# Patient Record
Sex: FEMALE | URBAN - METROPOLITAN AREA
[De-identification: names, ages, dates, MRNs, and addresses within clinical notes are randomized per-mention and may not be internally consistent; named-entity substitution may affect disease eponyms.]

---

## 2022-08-06 ENCOUNTER — APPOINTMENT (RX ONLY)
Dept: URBAN - METROPOLITAN AREA CLINIC 328 | Facility: CLINIC | Age: 42
Setting detail: DERMATOLOGY
End: 2022-08-06

## 2022-08-06 DIAGNOSIS — Z41.9 ENCOUNTER FOR PROCEDURE FOR PURPOSES OTHER THAN REMEDYING HEALTH STATE, UNSPECIFIED: ICD-10-CM

## 2022-08-06 PROCEDURE — ? COSMETIC CONSULTATION: FILLERS

## 2022-08-06 PROCEDURE — ? FILLERS

## 2022-08-06 PROCEDURE — ? BOTOX

## 2022-08-06 ASSESSMENT — LOCATION ZONE DERM: LOCATION ZONE: FACE

## 2022-08-06 ASSESSMENT — LOCATION SIMPLE DESCRIPTION DERM: LOCATION SIMPLE: LEFT CHEEK

## 2022-08-06 ASSESSMENT — LOCATION DETAILED DESCRIPTION DERM: LOCATION DETAILED: LEFT CENTRAL MALAR CHEEK

## 2022-08-06 NOTE — PROCEDURE: BOTOX
Post-Care Instructions: 1.  Do not lie down or bend forward for 4 hours.\\n\\n2. Work the treated areas by wrinkling the forehead or frowning for five minutes out of every hour for 4 hours.\\n\\n3.  Do not exercise (running, aerobics, weight lifting, etc...) for 24 hours.\\n\\n4.  No aspirin or ibuprofen for 24 hours.\\n\\n5.  Avoid wearing hats, head bands, or any tight head pieces for 24 hours.\\n\\n6.  It will take 2 weeks to get the full effect of Botox.\\n\\n7.  If bruising is a concern, Arnica is a supplement that minimizes bruising.  To prevent bruising, the supplement can be taken once daily for 1 week prior to the procedure.  This can also be taken starting on the same day as the procedure to minimize bruising and quicken the healing process if bruises do appear.  If starting on the same day as the procedure, please follow instructions on the package.  This supplement is available at local vitamin shops.

## 2022-08-18 ENCOUNTER — APPOINTMENT (RX ONLY)
Dept: URBAN - METROPOLITAN AREA CLINIC 331 | Facility: CLINIC | Age: 42
Setting detail: DERMATOLOGY
End: 2022-08-18

## 2022-08-18 DIAGNOSIS — Z41.9 ENCOUNTER FOR PROCEDURE FOR PURPOSES OTHER THAN REMEDYING HEALTH STATE, UNSPECIFIED: ICD-10-CM

## 2022-08-18 PROCEDURE — ? JUVEDERM ULTRA XC INJECTION

## 2022-09-09 ENCOUNTER — APPOINTMENT (RX ONLY)
Dept: URBAN - METROPOLITAN AREA CLINIC 331 | Facility: CLINIC | Age: 42
Setting detail: DERMATOLOGY
End: 2022-09-09

## 2022-09-09 DIAGNOSIS — Z41.9 ENCOUNTER FOR PROCEDURE FOR PURPOSES OTHER THAN REMEDYING HEALTH STATE, UNSPECIFIED: ICD-10-CM

## 2022-09-09 PROCEDURE — ? JUVEDERM ULTRA PLUS XC INJECTION

## 2022-09-09 PROCEDURE — ? ADDITIONAL NOTES

## 2022-09-09 ASSESSMENT — LOCATION SIMPLE DESCRIPTION DERM: LOCATION SIMPLE: LEFT LIP

## 2022-09-09 ASSESSMENT — LOCATION DETAILED DESCRIPTION DERM: LOCATION DETAILED: LEFT SUPERIOR VERMILION LIP

## 2022-09-09 ASSESSMENT — LOCATION ZONE DERM: LOCATION ZONE: LIP

## 2022-09-09 NOTE — PROCEDURE: JUVEDERM ULTRA PLUS XC INJECTION
Mid Face Filler  Volume In Cc: 0
Map Statment: See Attach Map for Complete Details
Detail Level: Detailed
Anesthesia Volume In Cc: 0.5
Include Cannula Information In Note?: No
Post-Care Instructions: Patient instructed to apply ice to reduce swelling.
Number Of Syringes (Required For Inventory): 1
Procedural Text: The filler was administered to the treatment areas noted above.
Consent: Written consent obtained. Risks include but not limited to bruising, beading, irregular texture, ulceration, infection, allergic reaction, scar formation, incomplete augmentation, temporary nature, procedural pain.
Additional Anesthesia Volume In Cc: 6
Anesthesia Type: 1% lidocaine with epinephrine
Filler: Juvederm Ultra Plus XC
(0) independent

## 2022-09-09 NOTE — PROCEDURE: ADDITIONAL NOTES
Render Risk Assessment In Note?: no
Additional Notes: During her previous visit patient had a vasovagal episode and we were not able to do her lip filler. It was completed today with no complications. NO charge for today as she paid during her prior visit.
Detail Level: Simple

## 2022-09-22 ENCOUNTER — APPOINTMENT (RX ONLY)
Dept: URBAN - METROPOLITAN AREA CLINIC 331 | Facility: CLINIC | Age: 42
Setting detail: DERMATOLOGY
End: 2022-09-22

## 2022-09-22 DIAGNOSIS — Z41.9 ENCOUNTER FOR PROCEDURE FOR PURPOSES OTHER THAN REMEDYING HEALTH STATE, UNSPECIFIED: ICD-10-CM

## 2022-09-22 PROCEDURE — ? ADDITIONAL NOTES

## 2022-09-22 PROCEDURE — ? BOTOX

## 2022-09-22 ASSESSMENT — LOCATION ZONE DERM: LOCATION ZONE: LIP

## 2022-09-22 ASSESSMENT — LOCATION DETAILED DESCRIPTION DERM
LOCATION DETAILED: RIGHT UPPER CUTANEOUS LIP
LOCATION DETAILED: LEFT UPPER CUTANEOUS LIP

## 2022-09-22 ASSESSMENT — LOCATION SIMPLE DESCRIPTION DERM
LOCATION SIMPLE: RIGHT LIP
LOCATION SIMPLE: LEFT LIP

## 2022-09-22 NOTE — PROCEDURE: ADDITIONAL NOTES
Render Risk Assessment In Note?: no
Detail Level: Simple
Additional Notes: Per RT, patient to give lips more time to heal, no need for filler injections today.

## 2022-09-22 NOTE — PROCEDURE: BOTOX
Periorbital Skin Units: 0
Show Additional Area 6: Yes
Show Lcl Units: No
Comments: Sample used
Additional Area 1 Location: lip flip
Additional Area 1 Units: 4
Consent: Written consent obtained. Risks include but not limited to lid/brow ptosis, bruising, swelling, diplopia, temporary effect, incomplete chemical denervation.
Detail Level: Detailed
Post-Care Instructions: Patient instructed to not lie down for 4 hours and limit physical activity for 24 hours.

## 2024-12-28 ENCOUNTER — APPOINTMENT (OUTPATIENT)
Dept: URBAN - METROPOLITAN AREA CLINIC 331 | Facility: CLINIC | Age: 44
Setting detail: DERMATOLOGY
End: 2024-12-28

## 2024-12-28 DIAGNOSIS — L98.8 OTHER SPECIFIED DISORDERS OF THE SKIN AND SUBCUTANEOUS TISSUE: ICD-10-CM

## 2024-12-28 PROCEDURE — ? VENUS VIVA

## 2024-12-28 PROCEDURE — ? PRESCRIPTION

## 2024-12-28 RX ORDER — METHYLPREDNISOLONE 4 MG/1
TABLET ORAL
Qty: 1 | Refills: 0 | Status: ERX | COMMUNITY
Start: 2024-12-28

## 2024-12-28 RX ADMIN — METHYLPREDNISOLONE: 4 TABLET ORAL at 00:00

## 2024-12-28 ASSESSMENT — LOCATION SIMPLE DESCRIPTION DERM: LOCATION SIMPLE: LEFT FOREHEAD

## 2024-12-28 ASSESSMENT — LOCATION ZONE DERM: LOCATION ZONE: FACE

## 2024-12-28 ASSESSMENT — LOCATION DETAILED DESCRIPTION DERM: LOCATION DETAILED: LEFT INFERIOR MEDIAL FOREHEAD

## 2024-12-28 NOTE — PROCEDURE: VENUS VIVA
Post-Care Instructions: I reviewed with the patient in detail post-care instructions. Patient should stay away from the sun and wear sun protection until treated areas are fully healed.
Treatment Number: 1
Pre-Procedures Photographs: Yes
Starting Temperature In C: 35
Volts: 30
Treatment Time: 30 min
Final Radiofrequency %: 45
Price (Use Numbers Only, No Special Characters Or $): 500
Applicator: diamondpolar
Applicator: Viva
Post-Procedure Text: Vaseline and ice applied. Post care reviewed with patient.
Milliseconds: 250
Detail Level: Zone
Immediate Post-Procedure Findings: no edema or erythema
Consent: Written consent obtained, risks reviewed including but not limited to crusting, scabbing, blistering, scarring, darker or lighter pigmentary change, and/or incomplete removal.

## 2025-08-16 ENCOUNTER — APPOINTMENT (OUTPATIENT)
Dept: URBAN - METROPOLITAN AREA CLINIC 331 | Facility: CLINIC | Age: 45
Setting detail: DERMATOLOGY
End: 2025-08-16

## 2025-08-16 DIAGNOSIS — L98.8 OTHER SPECIFIED DISORDERS OF THE SKIN AND SUBCUTANEOUS TISSUE: ICD-10-CM

## 2025-08-16 PROCEDURE — ? SMARTXIDE TETRA CO2 LASER- COOLPEEL

## 2025-08-16 PROCEDURE — ? COUNSELING

## 2025-08-16 PROCEDURE — ? PRESCRIPTION

## 2025-08-16 RX ORDER — BETAMETHASONE VALERATE 1 MG/G
OINTMENT TOPICAL
Qty: 45 | Refills: 0 | Status: ERX | COMMUNITY
Start: 2025-08-16

## 2025-08-16 RX ADMIN — BETAMETHASONE VALERATE: 1 OINTMENT TOPICAL at 00:00
